# Patient Record
Sex: FEMALE | Race: BLACK OR AFRICAN AMERICAN | URBAN - METROPOLITAN AREA
[De-identification: names, ages, dates, MRNs, and addresses within clinical notes are randomized per-mention and may not be internally consistent; named-entity substitution may affect disease eponyms.]

---

## 2019-07-16 ENCOUNTER — APPOINTMENT (RX ONLY)
Dept: URBAN - METROPOLITAN AREA CLINIC 49 | Facility: CLINIC | Age: 39
Setting detail: DERMATOLOGY
End: 2019-07-16

## 2019-07-16 DIAGNOSIS — L21.8 OTHER SEBORRHEIC DERMATITIS: ICD-10-CM

## 2019-07-16 PROBLEM — K21.9 GASTRO-ESOPHAGEAL REFLUX DISEASE WITHOUT ESOPHAGITIS: Status: ACTIVE | Noted: 2019-07-16

## 2019-07-16 PROBLEM — I10 ESSENTIAL (PRIMARY) HYPERTENSION: Status: ACTIVE | Noted: 2019-07-16

## 2019-07-16 PROCEDURE — 99212 OFFICE O/P EST SF 10 MIN: CPT

## 2019-07-16 PROCEDURE — ? PRESCRIPTION

## 2019-07-16 PROCEDURE — ? COUNSELING

## 2019-07-16 RX ORDER — FLUOCINOLONE ACETONIDE 0.11 MG/ML
OIL TOPICAL
Qty: 1 | Refills: 12 | Status: ERX | COMMUNITY
Start: 2019-07-16

## 2019-07-16 RX ORDER — CLOBETASOL PROPIONATE 0.5 MG/ML
SHAMPOO TOPICAL
Qty: 1 | Refills: 12 | Status: ERX | COMMUNITY
Start: 2019-07-16

## 2019-07-16 RX ADMIN — CLOBETASOL PROPIONATE: 0.5 SHAMPOO TOPICAL at 13:28

## 2019-07-16 RX ADMIN — FLUOCINOLONE ACETONIDE: 0.11 OIL TOPICAL at 13:28

## 2019-07-16 ASSESSMENT — LOCATION ZONE DERM: LOCATION ZONE: SCALP

## 2019-07-16 ASSESSMENT — LOCATION DETAILED DESCRIPTION DERM: LOCATION DETAILED: LEFT SUPERIOR PARIETAL SCALP

## 2019-07-16 ASSESSMENT — LOCATION SIMPLE DESCRIPTION DERM: LOCATION SIMPLE: SCALP

## 2019-07-16 NOTE — HPI: RASH
How Severe Is Your Rash?: mild
Is This A New Presentation, Or A Follow-Up?: Follow Up Rash
Additional History: Patient here for medication refill